# Patient Record
Sex: MALE | Race: WHITE
[De-identification: names, ages, dates, MRNs, and addresses within clinical notes are randomized per-mention and may not be internally consistent; named-entity substitution may affect disease eponyms.]

---

## 2019-12-04 ENCOUNTER — HOSPITAL ENCOUNTER (OUTPATIENT)
Dept: HOSPITAL 19 - COL.CAR | Age: 74
Discharge: HOME | End: 2019-12-04
Attending: INTERNAL MEDICINE
Payer: MEDICARE

## 2019-12-04 VITALS — DIASTOLIC BLOOD PRESSURE: 63 MMHG | SYSTOLIC BLOOD PRESSURE: 98 MMHG | HEART RATE: 57 BPM

## 2019-12-04 VITALS — DIASTOLIC BLOOD PRESSURE: 66 MMHG | SYSTOLIC BLOOD PRESSURE: 99 MMHG | HEART RATE: 62 BPM

## 2019-12-04 VITALS — DIASTOLIC BLOOD PRESSURE: 63 MMHG | SYSTOLIC BLOOD PRESSURE: 80 MMHG | HEART RATE: 66 BPM

## 2019-12-04 VITALS — SYSTOLIC BLOOD PRESSURE: 91 MMHG | HEART RATE: 63 BPM | DIASTOLIC BLOOD PRESSURE: 59 MMHG

## 2019-12-04 VITALS — HEART RATE: 63 BPM | SYSTOLIC BLOOD PRESSURE: 106 MMHG | DIASTOLIC BLOOD PRESSURE: 69 MMHG

## 2019-12-04 VITALS — HEART RATE: 56 BPM | SYSTOLIC BLOOD PRESSURE: 83 MMHG | DIASTOLIC BLOOD PRESSURE: 53 MMHG

## 2019-12-04 VITALS — SYSTOLIC BLOOD PRESSURE: 81 MMHG | HEART RATE: 58 BPM | DIASTOLIC BLOOD PRESSURE: 55 MMHG

## 2019-12-04 VITALS — HEART RATE: 61 BPM | SYSTOLIC BLOOD PRESSURE: 107 MMHG | DIASTOLIC BLOOD PRESSURE: 87 MMHG

## 2019-12-04 VITALS — DIASTOLIC BLOOD PRESSURE: 84 MMHG | SYSTOLIC BLOOD PRESSURE: 142 MMHG | HEART RATE: 61 BPM

## 2019-12-04 VITALS — DIASTOLIC BLOOD PRESSURE: 52 MMHG | SYSTOLIC BLOOD PRESSURE: 100 MMHG | HEART RATE: 56 BPM

## 2019-12-04 VITALS — SYSTOLIC BLOOD PRESSURE: 93 MMHG | HEART RATE: 62 BPM | DIASTOLIC BLOOD PRESSURE: 63 MMHG

## 2019-12-04 VITALS — SYSTOLIC BLOOD PRESSURE: 97 MMHG | DIASTOLIC BLOOD PRESSURE: 50 MMHG | HEART RATE: 58 BPM

## 2019-12-04 VITALS — SYSTOLIC BLOOD PRESSURE: 107 MMHG | DIASTOLIC BLOOD PRESSURE: 58 MMHG | HEART RATE: 58 BPM

## 2019-12-04 VITALS — DIASTOLIC BLOOD PRESSURE: 91 MMHG | TEMPERATURE: 96.9 F | SYSTOLIC BLOOD PRESSURE: 111 MMHG | HEART RATE: 50 BPM

## 2019-12-04 VITALS — DIASTOLIC BLOOD PRESSURE: 46 MMHG | HEART RATE: 59 BPM | SYSTOLIC BLOOD PRESSURE: 97 MMHG

## 2019-12-04 VITALS — HEART RATE: 58 BPM | SYSTOLIC BLOOD PRESSURE: 105 MMHG | DIASTOLIC BLOOD PRESSURE: 59 MMHG

## 2019-12-04 VITALS — BODY MASS INDEX: 27.87 KG/M2 | WEIGHT: 183.87 LBS | HEIGHT: 67.99 IN

## 2019-12-04 VITALS — SYSTOLIC BLOOD PRESSURE: 80 MMHG | DIASTOLIC BLOOD PRESSURE: 50 MMHG | HEART RATE: 58 BPM

## 2019-12-04 DIAGNOSIS — I27.20: ICD-10-CM

## 2019-12-04 DIAGNOSIS — I25.110: Primary | ICD-10-CM

## 2019-12-04 DIAGNOSIS — E78.5: ICD-10-CM

## 2019-12-04 DIAGNOSIS — R94.39: ICD-10-CM

## 2019-12-04 DIAGNOSIS — I08.3: ICD-10-CM

## 2019-12-04 DIAGNOSIS — I10: ICD-10-CM

## 2019-12-04 LAB
ANION GAP SERPL CALC-SCNC: 9 MMOL/L (ref 7–16)
APTT PPP: 34.8 SECONDS (ref 26–37)
BUN SERPL-MCNC: 18 MG/DL (ref 9–20)
CALCIUM SERPL-MCNC: 9.4 MG/DL (ref 8.4–10.2)
CHLORIDE SERPL-SCNC: 105 MMOL/L (ref 98–107)
CO2 SERPL-SCNC: 25 MMOL/L (ref 22–30)
CREAT SERPL-SCNC: 0.84 UMOL/L (ref 0.66–1.25)
ERYTHROCYTE [DISTWIDTH] IN BLOOD BY AUTOMATED COUNT: 12.4 % (ref 11.5–14.5)
GLUCOSE SERPL-MCNC: 98 MG/DL (ref 74–106)
HCT VFR BLD AUTO: 38 % (ref 42–52)
HGB BLD-MCNC: 13.2 G/DL (ref 13.5–18)
INR BLD: 1.2 (ref 0.8–3)
MCH RBC QN AUTO: 34 PG (ref 27–31)
MCHC RBC AUTO-ENTMCNC: 35 G/DL (ref 33–37)
MCV RBC AUTO: 97 FL (ref 80–100)
PLATELET # BLD AUTO: 139 K/MM3 (ref 130–400)
PMV BLD AUTO: 9.6 FL (ref 7.4–10.4)
POTASSIUM SERPL-SCNC: 4.2 MMOL/L (ref 3.4–5)
PROTHROMBIN TIME: 14.3 SECONDS (ref 9.7–12.8)
RBC # BLD AUTO: 3.93 M/MM3 (ref 4.2–5.6)
SODIUM SERPL-SCNC: 139 MMOL/L (ref 137–145)

## 2019-12-04 NOTE — NUR
SEE MERGE DOCUMENTATION FOR MEDICATION ADMINISTRATION TIMES AND INTRA/POST
PROCEDURE SEDATION ASSESSMENTS. PLAN FOR RIGHT RADIAL ACCESS; BARBEAU TEST
POSITIVE TO RIGHT HAND.

## 2019-12-04 NOTE — NUR
Discharge instructions given to pt.pt verbalizes understanding.INT
removed,catheter tip intact.Pt escorted out via wheelchair by this nurse.

## 2019-12-04 NOTE — NUR
Reviewed pt vital signs with dr mcelroy.per dr mcelroy,250ml bolus at this
time.given from his 1/2 ns bag over 30min.Will continue to monitor.